# Patient Record
Sex: FEMALE | Race: WHITE | Employment: OTHER | URBAN - METROPOLITAN AREA
[De-identification: names, ages, dates, MRNs, and addresses within clinical notes are randomized per-mention and may not be internally consistent; named-entity substitution may affect disease eponyms.]

---

## 2017-01-06 DIAGNOSIS — R41.89 COGNITIVE IMPAIRMENT: ICD-10-CM

## 2017-03-01 ENCOUNTER — TELEPHONE (OUTPATIENT)
Dept: FAMILY MEDICINE CLINIC | Age: 72
End: 2017-03-01

## 2017-03-01 ENCOUNTER — OFFICE VISIT (OUTPATIENT)
Dept: FAMILY MEDICINE CLINIC | Age: 72
End: 2017-03-01

## 2017-03-01 VITALS
SYSTOLIC BLOOD PRESSURE: 114 MMHG | OXYGEN SATURATION: 97 % | HEIGHT: 64 IN | BODY MASS INDEX: 28.34 KG/M2 | TEMPERATURE: 98 F | WEIGHT: 166 LBS | HEART RATE: 76 BPM | DIASTOLIC BLOOD PRESSURE: 82 MMHG | RESPIRATION RATE: 20 BRPM

## 2017-03-01 DIAGNOSIS — F03.90 DEMENTIA WITHOUT BEHAVIORAL DISTURBANCE, UNSPECIFIED DEMENTIA TYPE: ICD-10-CM

## 2017-03-01 DIAGNOSIS — L30.9 DERMATITIS: ICD-10-CM

## 2017-03-01 DIAGNOSIS — Z23 ENCOUNTER FOR IMMUNIZATION: ICD-10-CM

## 2017-03-01 DIAGNOSIS — F32.2 SEVERE SINGLE CURRENT EPISODE OF MAJOR DEPRESSIVE DISORDER, WITHOUT PSYCHOTIC FEATURES (HCC): Primary | ICD-10-CM

## 2017-03-01 RX ORDER — TRIAMCINOLONE ACETONIDE 1 MG/G
OINTMENT TOPICAL 2 TIMES DAILY
Qty: 30 G | Refills: 0 | Status: SHIPPED | OUTPATIENT
Start: 2017-03-01 | End: 2017-04-14 | Stop reason: SDUPTHER

## 2017-03-01 NOTE — PROGRESS NOTES
Maci Dooley is a 80 y.o. female  1. Have you been to the ER, urgent care clinic since your last visit? Hospitalized since your last visit? No    2. Have you seen or consulted any other health care providers outside of the [address] [place] [address] since your last visit? Include any pap smears or colon screening.  No
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
## 2017-03-01 NOTE — TELEPHONE ENCOUNTER
[facility] called stating the pt is currently at their office and needs a referral. She is being seen for a \"skin check\" because she has areas of concern on chest.     is requesting that we call them with [facility] #    Dr. Sherice Quinones  NPI: [id number]  Phone: [phone]  Fax: [phone]

## 2017-03-01 NOTE — PROGRESS NOTES
PPD Placement note  Shankar Ortiz, 80 y.o. 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 Yes  Is patient taking any oral or IV steroid medication now or have they taken it in the last month? no  Has the patient ever received the BCG vaccine?: no  Has the patient been in recent contact with anyone known or suspected of having active TB disease?: no       Date of exposure (if applicable): na       Name of person they were exposed to (if applicable): na  Patient's Country of origin?: [place]  O: Alert and oriented in NAD. P:  PPD placed on 3/1/2017. Patient advised to return for reading within 48-72 hours.

## 2017-03-01 NOTE — MR AVS SNAPSHOT
Visit Information Date & Time Provider Department Dept. Phone Encounter #  
 3/1/2017 10:30 AM Jose Pham [facility] [phone] [id number] Upcoming Health Maintenance Date Due Hepatitis C Screening 1945 FOBT Q 1 YEAR, 18+ 2/8/2017 GLAUCOMA SCREENING Q2Y 9/22/2017 MEDICARE YEARLY EXAM 12/7/2017 COLONOSCOPY 8/13/2019 DTaP/Tdap/Td series (2 - Td) 8/22/2026 Allergies as of 3/1/2017  Review Complete On: 3/1/2017 By: Genevieve Cogan, LPN Severity Noted Reaction Type Reactions Latex Medium 07/25/2016    Other (comments) \"my skin peels off. \" \"just the bandages\" Mold Extracts High 07/25/2016    Other (comments) Sulfa (Sulfonamide Antibiotics) High   Shortness of Breath Throat closes up, can't breathe, vomit Sulfamethoxazole-trimethoprim High 07/25/2016    Other (comments) Tolterodine Medium 07/25/2016    Other (comments) Trospium Medium 07/25/2016    Other (comments) Oxycodone  08/24/2012    Other (comments) Weakness, vivid dreams Current Immunizations  Reviewed on 10/12/2015 Name Date Influenza High Dose Vaccine PF 1/3/2017, 9/6/2016  3:57 PM  
 Influenza Vaccine 11/1/2015, 10/10/2015, 10/24/2013, 11/9/2010, 11/2/2009, 11/4/2008 Influenza Vaccine PF  Incomplete, 12/10/2012, 11/2/2011 Novel Influenza-H1N1-09, All Formulations 12/29/2009 Pneumococcal Conjugate (PCV-13) 10/10/2015 Pneumococcal Polysaccharide (PPSV-23) 11/1/2015, 11/2/2009 Zoster Vaccine, Live 6/15/2014 Not reviewed this visit You Were Diagnosed With   
  
 Codes Comments Severe single current episode of major depressive disorder, without psychotic features ([facility] [address]    -  Primary ICD-10-CM: F32.2 ICD-9-CM: 296.23 Dermatitis     ICD-10-CM: L30.9 ICD-9-CM: 692.9 Dementia without behavioral disturbance, unspecified dementia type     ICD-10-CM: F03.90 ICD-9-CM: 294.20 Vitals  BP  
  
  
  
  
 114/82 (BP 1 Location: Left arm, BP Patient Position: Sitting) BMI and BSA Data Body Mass Index Body Surface Area  
 28.49 kg/m 2 1.84 m 2 Preferred Pharmacy Pharmacy Name Phone [facility], [address] Your Updated Medication List  
  
   
This list is accurate as of: 3/1/17 10:57 AM.  Always use your most recent med list.  
  
  
  
  
 escitalopram oxalate 20 mg tablet Commonly known as:  Cherre Jews Take 1 Tab by mouth daily. nitrofurantoin (macrocrystal-monohydrate) 100 mg capsule Commonly known as:  MACROBID Take 1 Cap by mouth daily. For purposes of UTI suppression. triamcinolone acetonide 0.1 % ointment Commonly known as:  KENALOG Apply  to affected area two (2) times a day. use thin layer Prescriptions Sent to Pharmacy Refills  
 triamcinolone acetonide (KENALOG) 0.1 % ointment 0 Sig: Apply  to affected area two (2) times a day. use thin layer Class: Normal  
 Pharmacy: [facility] [address] #: [phone] Route: Topical  
  
Introducing [facility] & HEALTH SERVICES! Dear Yaakov Martin: Thank you for requesting a [employer] account. Our records indicate that you have previously registered for a [employer] account but its currently inactive. Please call our In[employer] support line at [phone]. Additional Information If you have questions, please visit the Frequently Asked Questions section of the [employer] website at [email or web address] Remember, [employer] is NOT to be used for urgent needs. For medical emergencies, dial 911. Now available from your iPhone and Android! Please provide this summary of care documentation to your next provider. Your primary care clinician is listed as Caryl Umana. If you have any questions after today's visit, please call [phone].

## 2017-03-03 ENCOUNTER — CLINICAL SUPPORT (OUTPATIENT)
Dept: FAMILY MEDICINE CLINIC | Age: 72
End: 2017-03-03

## 2017-03-03 DIAGNOSIS — Z11.1 ENCOUNTER FOR PPD SKIN TEST READING: Primary | ICD-10-CM

## 2017-03-03 LAB
MM INDURATION POC: 0 MM (ref 0–5)
PPD POC: NEGATIVE NEGATIVE

## 2017-03-03 NOTE — LETTER
3/3/2017 10:35 AM 
 
Ms. Katt Hernandez [address] [facility] [address] [place] [address] Date PPD placed: March 1, 2017 Date PPD read: March 3, 2017 Result: Negative Read by:  [address]

## 2017-04-03 ENCOUNTER — TELEPHONE (OUTPATIENT)
Dept: FAMILY MEDICINE CLINIC | Age: 72
End: 2017-04-03

## 2017-04-03 NOTE — TELEPHONE ENCOUNTER
Pt needs a referral to [facility] for areas of concern for chest and face. Fax referral confirmation to [phone]. Pt is scheduled for 4/6/17. Pt has  PSafe.

## 2017-04-14 RX ORDER — TRIAMCINOLONE ACETONIDE 1 MG/G
OINTMENT TOPICAL 2 TIMES DAILY
Qty: 30 G | Refills: 0 | Status: SHIPPED | OUTPATIENT
Start: 2017-04-14 | End: 2017-06-07 | Stop reason: ALTCHOICE

## 2017-06-07 ENCOUNTER — OFFICE VISIT (OUTPATIENT)
Dept: FAMILY MEDICINE CLINIC | Age: 72
End: 2017-06-07

## 2017-06-07 VITALS
WEIGHT: 172 LBS | SYSTOLIC BLOOD PRESSURE: 124 MMHG | OXYGEN SATURATION: 96 % | DIASTOLIC BLOOD PRESSURE: 76 MMHG | RESPIRATION RATE: 16 BRPM | TEMPERATURE: 98.2 F | HEART RATE: 62 BPM | HEIGHT: 64 IN | BODY MASS INDEX: 29.37 KG/M2

## 2017-06-07 DIAGNOSIS — L30.9 DERMATITIS: Primary | ICD-10-CM

## 2017-06-07 RX ORDER — BISMUTH SUBSALICYLATE 262 MG
1 TABLET,CHEWABLE ORAL DAILY
COMMUNITY

## 2017-06-07 RX ORDER — ASPIRIN 81 MG/1
TABLET ORAL DAILY
COMMUNITY

## 2017-06-07 NOTE — MR AVS SNAPSHOT
Visit Information Date & Time Provider Department Dept. Phone Encounter #  
 6/7/2017 11:00 AM Zofia Oswald, Jose [facility] [phone] [id number] Upcoming Health Maintenance Date Due Hepatitis C Screening 1945 FOBT Q 1 YEAR, 18+ 2/8/2017 INFLUENZA AGE 9 TO ADULT 8/1/2017 GLAUCOMA SCREENING Q2Y 9/22/2017 MEDICARE YEARLY EXAM 12/7/2017 COLONOSCOPY 8/13/2019 DTaP/Tdap/Td series (2 - Td) 8/22/2026 Allergies as of 6/7/2017  Review Complete On: 6/7/2017 By: Grant Lowe LPN Severity Noted Reaction Type Reactions Latex Medium 07/25/2016    Other (comments) \"my skin peels off. \" \"just the bandages\" Mold Extracts High 07/25/2016    Other (comments) Sulfa (Sulfonamide Antibiotics) High   Shortness of Breath Throat closes up, can't breathe, vomit Sulfamethoxazole-trimethoprim High 07/25/2016    Other (comments) Tolterodine Medium 07/25/2016    Other (comments) Trospium Medium 07/25/2016    Other (comments) Oxycodone  08/24/2012    Other (comments) Weakness, vivid dreams Current Immunizations  Reviewed on 3/1/2017 Name Date Influenza High Dose Vaccine PF 1/3/2017, 9/6/2016  3:57 PM  
 Influenza Vaccine 11/1/2015, 10/10/2015, 10/24/2013, 11/9/2010, 11/2/2009, 11/4/2008 Influenza Vaccine PF  Incomplete, 12/10/2012, 11/2/2011 Novel Influenza-H1N1-09, All Formulations 12/29/2009 Pneumococcal Conjugate (PCV-13) 10/10/2015 Pneumococcal Polysaccharide (PPSV-23) 11/1/2015, 11/2/2009 TB Skin Test (PPD) Intradermal 3/1/2017 11:04 AM  
 Zoster Vaccine, Live 6/15/2014 Not reviewed this visit You Were Diagnosed With   
  
 Codes Comments Dermatitis    -  Primary ICD-10-CM: L30.9 ICD-9-CM: 692.9 Vitals BP Pulse Temp Resp Height(growth percentile) Weight(growth percentile)  124/76 (BP 1 Location: Right arm, BP Patient Position: Sitting) 62 98.2 °F (36.8 °C) (Oral) 16 5' 4\" (1.626 m) 172 lb (78 kg) SpO2 BMI OB Status Smoking Status 96% 29.52 kg/m2 Hysterectomy Never Smoker BMI and BSA Data Body Mass Index Body Surface Area  
 29.52 kg/m 2 1.88 m 2 Preferred Pharmacy Pharmacy Name Phone [facility], [address] Your Updated Medication List  
  
   
This list is accurate as of: 6/7/17 11:47 AM.  Always use your most recent med list.  
  
  
  
  
 aspirin delayed-release 81 mg tablet Take  by mouth daily. escitalopram oxalate 20 mg tablet Commonly known as:  Astrid Gins Take 1 Tab by mouth daily. multivitamin tablet Commonly known as:  ONE A DAY Take 1 Tab by mouth daily. nitrofurantoin (macrocrystal-monohydrate) 100 mg capsule Commonly known as:  MACROBID Take 1 Cap by mouth daily. For purposes of UTI suppression. We Performed the Following REFERRAL TO DERMATOLOGY [REF19 Custom] Comments:  
 doris Referral Information Referral ID Referred By Referred To  
  
 [id number] Andrew Murray V Not Available Visits Status Start Date End Date 1 New Request 6/7/17 6/7/18 If your referral has a status of pending review or denied, additional information will be sent to support the outcome of this decision. Introducing [facility] & HEALTH SERVICES! Dear Christin Mackey: Thank you for requesting a [employer] account. Our records indicate that you already have an active [employer] account. You can access your account anytime at [email or web address] Did you know that you can access your hospital and ER discharge instructions at any time in [employer]? You can also review all of your test results from your hospital stay or ER visit. Additional Information If you have questions, please visit the Frequently Asked Questions section of the [employer] management website at [email or web address] Remember, [employer] management is NOT to be used for urgent needs. For medical emergencies, dial 911. Now available from your iPhone and Android! Please provide this summary of care documentation to your next provider. Your primary care clinician is listed as Caryl Umana. If you have any questions after today's visit, please call [phone].

## 2017-06-07 NOTE — PROGRESS NOTES
Lorren Dubin is a 71 y.o. female here today for rash and itching and started a few weeks ago     1. Have you been to the ER, urgent care clinic or hospitalized since your last visit? NO.     2. Have you seen or consulted any other health care providers outside of the [address] [place] [address] since your last visit (Include any pap smears or colon screening)? NO      Do you have an Advanced Directive? Yes     Would you like information on Advanced Directives?  NO    Learning Assessment 9/9/2015   PRIMARY LEARNER Patient   HIGHEST LEVEL OF EDUCATION - PRIMARY LEARNER  4 YEARS OF COLLEGE   BARRIERS PRIMARY LEARNER -   CO-LEARNER CAREGIVER No   PRIMARY LANGUAGE ENGLISH   LEARNER PREFERENCE PRIMARY LISTENING     -   ANSWERED BY patient   RELATIONSHIP SELF

## 2017-06-07 NOTE — PROGRESS NOTES
Guero Moraes is a 71 y.o. female and presents with Rash and Skin Problem       Subjective:  Pt has a \"rash\" for a while. It's on her chest, stomach, scalp and posterior neck. She was prescribed kenalog but doesn't use it. It itches and she scratches a lot. ROS:  Negative except as mentioned above    SH:  Social History   Substance Use Topics    Smoking status: Never Smoker    Smokeless tobacco: Never Used    Alcohol use No         Medications/Allergies:  Current Outpatient Prescriptions on File Prior to Visit   Medication Sig Dispense Refill    nitrofurantoin, macrocrystal-monohydrate, (MACROBID) 100 mg capsule Take 1 Cap by mouth daily. For purposes of UTI suppression. 180 Cap 1    escitalopram oxalate (LEXAPRO) 20 mg tablet Take 1 Tab by mouth daily. 90 Tab 1     No current facility-administered medications on file prior to visit. Allergies   Allergen Reactions    Latex Other (comments)     \"my skin peels off. \" \"just the bandages\"    Mold Extracts Other (comments)    Sulfa (Sulfonamide Antibiotics) Shortness of Breath     Throat closes up, can't breathe, vomit    Sulfamethoxazole-Trimethoprim Other (comments)    Tolterodine Other (comments)    Trospium Other (comments)    Oxycodone Other (comments)     Weakness, vivid dreams       Objective:  Visit Vitals    /76 (BP 1 Location: Right arm, BP Patient Position: Sitting)    Pulse 62    Temp 98.2 °F (36.8 °C) (Oral)    Resp 16    Ht 5' 4\" (1.626 m)    Wt 172 lb (78 kg)    SpO2 96%    BMI 29.52 kg/m2     Constitutional: Well developed, nourished, no distress, alert   CV: S1, S2.  RRR. No murmurs/rubs. No thrills palpated. No carotid bruits. Intact distal pulses. No edema. Pulm: No abnormalities on inspection. Clear to auscultation bilaterally. No wheezing/rhonchi. Normal effort. Skin: scabs over R hairline/forehead, on stomach.   Chest wall (between breasts) with thickened erythematous rough plaque like lesion    Assessment/Plan:    Dermatitis - referral derm. The plan was discussed with the patient. The patient verbalized understanding and is in agreement with the plan. All medication potential side effects were discussed with the patient.

## 2017-07-03 DIAGNOSIS — F32.2 SEVERE SINGLE CURRENT EPISODE OF MAJOR DEPRESSIVE DISORDER, WITHOUT PSYCHOTIC FEATURES (HCC): ICD-10-CM

## 2017-07-03 RX ORDER — ESCITALOPRAM OXALATE 20 MG/1
20 TABLET ORAL DAILY
Qty: 90 TAB | Refills: 1 | Status: SHIPPED | OUTPATIENT
Start: 2017-07-03 | End: 2017-10-17 | Stop reason: ALTCHOICE

## 2017-08-15 ENCOUNTER — TELEPHONE (OUTPATIENT)
Dept: FAMILY MEDICINE CLINIC | Age: 72
End: 2017-08-15

## 2017-08-15 NOTE — TELEPHONE ENCOUNTER
Kieran Phillips (on pt's phi) called on behalf of the pt, he wanted to leave a message. He states that she has started the cylce again with the same exact reason like last time. He states that Dr Andrew Denise knows the situation and is requesting a call back.

## 2017-08-17 NOTE — TELEPHONE ENCOUNTER
I spoke with Dr. Tunde Agosto [phone], discussed patient's history, previous medications. I discussed often time the patient minimizes her symptoms because of a distrust of medical professionals and a h/o psychology training. In fact, patient has told me \"I know what I can and can't tell you\". The plan is to start mirtazapine, in addition to lexapro. I discussed this conversation with Vivek Eastman, the son, who is in agreement with this course of action.

## 2017-08-22 ENCOUNTER — TELEPHONE (OUTPATIENT)
Dept: FAMILY MEDICINE CLINIC | Age: 72
End: 2017-08-22

## 2017-08-22 NOTE — TELEPHONE ENCOUNTER
The [facility] in [place] called to schedule a hospital f/u appt for the pt. I was able to get the pt scheduled for a 30 min hospital f/u on Thursday 8/24 at 9am w/Dr. Griselda Filter.

## 2017-08-22 NOTE — TELEPHONE ENCOUNTER
Pt's daughter in law called stating that it was an emergency and that she needed to speak with Dr. Jovani SALAS. She states the Ms. Patel Benjamin was in a psych facility in [place] and when they went to visit they were repulsed by the conditions. She states that they are pulling Ms. Patel Benjamin out of the psych facility today and need an emergency appt. They are wanting to try to get Ms. Borja into [facility]. No available appts for tomorrow and no staff to ask if it is okay to double book. Please contact as soon as possible. Family is vwery concerned about pt.      Daughter in law Lily phone [phone]  Odalys Broussard phone [phone]

## 2017-08-24 ENCOUNTER — OFFICE VISIT (OUTPATIENT)
Dept: FAMILY MEDICINE CLINIC | Age: 72
End: 2017-08-24

## 2017-08-24 VITALS
HEART RATE: 74 BPM | HEIGHT: 64 IN | SYSTOLIC BLOOD PRESSURE: 124 MMHG | WEIGHT: 180.8 LBS | TEMPERATURE: 98.6 F | RESPIRATION RATE: 18 BRPM | BODY MASS INDEX: 30.87 KG/M2 | OXYGEN SATURATION: 95 % | DIASTOLIC BLOOD PRESSURE: 80 MMHG

## 2017-08-24 DIAGNOSIS — R45.89 SUICIDAL RISK: ICD-10-CM

## 2017-08-24 DIAGNOSIS — F05 SUNDOWNING: ICD-10-CM

## 2017-08-24 DIAGNOSIS — R41.3 MEMORY IMPAIRMENT: ICD-10-CM

## 2017-08-24 DIAGNOSIS — F41.8 DEPRESSION WITH ANXIETY: Primary | ICD-10-CM

## 2017-08-24 DIAGNOSIS — Z12.11 SCREEN FOR COLON CANCER: ICD-10-CM

## 2017-08-24 DIAGNOSIS — R48.8 PERSEVERATION: ICD-10-CM

## 2017-08-24 DIAGNOSIS — F22 PARANOID IDEATION (HCC): ICD-10-CM

## 2017-08-24 RX ORDER — MIRTAZAPINE 30 MG/1
30 TABLET, FILM COATED ORAL
Qty: 30 TAB | Refills: 1 | Status: SHIPPED | OUTPATIENT
Start: 2017-08-24 | End: 2017-10-17 | Stop reason: ALTCHOICE

## 2017-08-24 NOTE — MR AVS SNAPSHOT
Visit Information Date & Time Provider Department Dept. Phone Encounter #  
 8/24/2017  9:00 AM Elenita Brown, Maria Elena Parker [phone] [id number] Upcoming Health Maintenance Date Due Hepatitis C Screening 1945 FOBT Q 1 YEAR, 18+ 2/8/2017 GLAUCOMA SCREENING Q2Y 9/22/2017 MEDICARE YEARLY EXAM 12/7/2017 COLONOSCOPY 8/13/2019 DTaP/Tdap/Td series (2 - Td) 8/22/2026 Allergies as of 8/24/2017  Review Complete On: 8/24/2017 By: Elenita Brown MD  
  
 Severity Noted Reaction Type Reactions Latex Medium 07/25/2016    Other (comments) \"my skin peels off. \" \"just the bandages\" Mold Extracts High 07/25/2016    Other (comments) Sulfa (Sulfonamide Antibiotics) High   Shortness of Breath Throat closes up, can't breathe, vomit Sulfamethoxazole-trimethoprim High 07/25/2016    Other (comments) Tolterodine Medium 07/25/2016    Other (comments) Trospium Medium 07/25/2016    Other (comments) Oxycodone  08/24/2012    Other (comments) Weakness, vivid dreams Current Immunizations  Reviewed on 3/1/2017 Name Date Influenza High Dose Vaccine PF 8/22/2017, 1/3/2017, 9/6/2016  3:57 PM  
 Influenza Vaccine 11/1/2015, 10/10/2015, 10/24/2013, 11/9/2010, 11/2/2009, 11/4/2008 Influenza Vaccine PF 12/10/2012, 11/2/2011 Novel Influenza-H1N1-09, All Formulations 12/29/2009 Pneumococcal Conjugate (PCV-13) 10/10/2015 Pneumococcal Polysaccharide (PPSV-23) 11/1/2015, 11/2/2009 TB Skin Test (PPD) Intradermal 3/1/2017 11:04 AM  
 Zoster Vaccine, Live 6/15/2014 Not reviewed this visit You Were Diagnosed With   
  
 Codes Comments Depression with anxiety    -  Primary ICD-10-CM: F41.8 ICD-9-CM: 300.4 Suicidal risk     ICD-10-CM: R45.89 ICD-9-CM: 300.9 Sundowning     ICD-10-CM: F05 ICD-9-CM: [id number] Perseveration     ICD-10-CM: R48.8 ICD-9-CM: 784.69  Paranoid ideation ([facility] [address]     ICD-10-CM: F22 
 ICD-9-CM: 297.8 Screen for colon cancer     ICD-10-CM: Z12.11 ICD-9-CM: V76.51 Memory impairment     ICD-10-CM: R41.3 ICD-9-CM: 780.93 Vitals BP Pulse Temp Resp Height(growth percentile) Weight(growth percentile) 124/80 (BP 1 Location: Left arm, BP Patient Position: Sitting) 74 98.6 °F (37 °C) (Oral) 18 5' 4\" (1.626 m) 180 lb 12.8 oz (82 kg) SpO2 BMI OB Status Smoking Status 95% 31.03 kg/m2 Hysterectomy Never Smoker BMI and BSA Data Body Mass Index Body Surface Area 31.03 kg/m 2 1.92 m 2 Preferred Pharmacy Pharmacy Name Phone [facility], [address] [place] [address],[address] Your Updated Medication List  
  
   
This list is accurate as of: 8/24/17  9:51 AM.  Always use your most recent med list.  
  
  
  
  
 aspirin delayed-release 81 mg tablet Take  by mouth daily. escitalopram oxalate 20 mg tablet Commonly known as:  Giuseppe Snow Take 1 Tab by mouth daily. mirtazapine 30 mg tablet Commonly known as:  Marguerita Eagle Take 1 Tab by mouth nightly. multivitamin tablet Commonly known as:  ONE A DAY Take 1 Tab by mouth daily. Prescriptions Sent to Pharmacy Refills  
 mirtazapine (REMERON) 30 mg tablet 1 Sig: Take 1 Tab by mouth nightly. Class: Normal  
 Pharmacy: [address] #: [phone] Route: Oral  
  
We Performed the Following COLOGUARD TEST (FECAL DNA COLORECTAL CANCER SCREENING) [39629 CPT(R)] Patient Instructions Dr. Nae Romero Geriatric psychiatry [facility]-Psychiatry 
[phone] [facility]! Dear Cristina Camp: Thank you for requesting a [employer] account. Our records indicate that you already have an active [employer] account. You can access your account anytime at [email or web address] Did you know that you can access your hospital and ER discharge instructions at any time in Essential Viewing? You can also review all of your test results from your hospital stay or ER visit. Additional Information If you have questions, please visit the Frequently Asked Questions section of the Essential Viewing website at [email or web address] Remember, Essential Viewing is NOT to be used for urgent needs. For medical emergencies, dial 911. Now available from your iPhone and Android! Please provide this summary of care documentation to your next provider. Your primary care clinician is listed as Caryl Umana. If you have any questions after today's visit, please call [phone].
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
## 2017-08-24 NOTE — PROGRESS NOTES
1. Have you been to the ER, urgent care clinic since your last visit? Hospitalized since your last visit? Yes Where: [facility] 8/22 went in 8/5    2. Have you seen or consulted any other health care providers outside of the [address] [place] [address] since your last visit? Include any pap smears or colon screening. No      Health Maintenance Due   Topic Date Due    Hepatitis C Screening  1945    FOBT Q 1 YEAR, 18+  02/08/2017    GLAUCOMA SCREENING Q2Y  09/22/2017       Please see Red banners under Allergies, Med rec, Immunizations to remove outside inquires. All correct information has been verified with patient and added to chart.

## 2017-09-07 ENCOUNTER — TELEPHONE (OUTPATIENT)
Dept: FAMILY MEDICINE CLINIC | Age: 72
End: 2017-09-07

## 2017-09-07 NOTE — TELEPHONE ENCOUNTER
Pt daughter in law called to request a rx mirtazapine 15 mg. Lily stated that when they talked to Dr Leslie Mahajan there was talk about doubling the mg. She also stated that Dr. Leslie Mahajan has never prescribed the rx to the pt.

## 2017-10-06 ENCOUNTER — TELEPHONE (OUTPATIENT)
Dept: FAMILY MEDICINE CLINIC | Age: 72
End: 2017-10-06
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
## 2017-10-16 ENCOUNTER — TELEPHONE (OUTPATIENT)
Dept: FAMILY MEDICINE CLINIC | Age: 72
End: 2017-10-16

## 2017-10-16 ENCOUNTER — OFFICE VISIT (OUTPATIENT)
Dept: FAMILY MEDICINE CLINIC | Age: 72
End: 2017-10-16

## 2017-10-16 VITALS
RESPIRATION RATE: 18 BRPM | TEMPERATURE: 97.9 F | HEART RATE: 72 BPM | BODY MASS INDEX: 31.58 KG/M2 | OXYGEN SATURATION: 96 % | SYSTOLIC BLOOD PRESSURE: 138 MMHG | DIASTOLIC BLOOD PRESSURE: 76 MMHG | WEIGHT: 185 LBS | HEIGHT: 64 IN

## 2017-10-16 DIAGNOSIS — F32.2 SEVERE SINGLE CURRENT EPISODE OF MAJOR DEPRESSIVE DISORDER, WITHOUT PSYCHOTIC FEATURES (HCC): Primary | ICD-10-CM

## 2017-10-16 NOTE — PATIENT INSTRUCTIONS
Make an appointment with:     [facility] SPECIALTY [facility] Psychiatry  [address], [facility] [place] [address] [address] [address] [facility], [address]  [phone]

## 2017-10-16 NOTE — TELEPHONE ENCOUNTER
Son Maureen Ness has the information. He also asked for neuropsych drs and I gave him Dr. Mikayla Thapa and Ervin Rue Marcela Aguila.

## 2017-10-16 NOTE — TELEPHONE ENCOUNTER
Please call Jacki Szymanski, patient's son/poa, and tell him that I saw his mom today. I'm strongly recommending she see psychiatry here. She already has a psychologist/counselor. But, I feels she needs intervention from a psychiatrist medically. [address] [place] [address]  [phone]

## 2017-10-16 NOTE — MR AVS SNAPSHOT
Visit Information Date & Time Provider Department Dept. Phone Encounter #  
 10/16/2017  9:30 AM Abiodun Fong, [place] [phone] [id number] Upcoming Health Maintenance Date Due Hepatitis C Screening 1945 FOBT Q 1 YEAR, 18+ 2/8/2017 GLAUCOMA SCREENING Q2Y 9/22/2017 MEDICARE YEARLY EXAM 12/7/2017 COLONOSCOPY 8/13/2019 DTaP/Tdap/Td series (2 - Td) 8/22/2026 Allergies as of 10/16/2017  Review Complete On: 10/16/2017 By: Abiodun Fong MD  
  
 Severity Noted Reaction Type Reactions Latex Medium 07/25/2016    Other (comments) \"my skin peels off. \" \"just the bandages\" Mold Extracts High 07/25/2016    Other (comments) Sulfa (Sulfonamide Antibiotics) High   Shortness of Breath Throat closes up, can't breathe, vomit Sulfamethoxazole-trimethoprim High 07/25/2016    Other (comments) Tolterodine Medium 07/25/2016    Other (comments) Trospium Medium 07/25/2016    Other (comments) Oxycodone  08/24/2012    Other (comments) Weakness, vivid dreams Current Immunizations  Reviewed on 3/1/2017 Name Date Influenza High Dose Vaccine PF 8/22/2017, 1/3/2017, 9/6/2016  3:57 PM  
 Influenza Vaccine 11/1/2015, 10/10/2015, 10/24/2013, 11/9/2010, 11/2/2009, 11/4/2008 Influenza Vaccine PF 12/10/2012, 11/2/2011 Novel Influenza-H1N1-09, All Formulations 12/29/2009 Pneumococcal Conjugate (PCV-13) 10/10/2015 Pneumococcal Polysaccharide (PPSV-23) 11/1/2015, 11/2/2009 TB Skin Test (PPD) Intradermal 3/1/2017 11:04 AM  
 Zoster Vaccine, Live 6/15/2014 Not reviewed this visit Vitals BP Pulse Temp Resp Height(growth percentile) Weight(growth percentile) 138/76 (BP 1 Location: Left arm, BP Patient Position: Sitting) 72 97.9 °F (36.6 °C) (Oral) 18 5' 4\" (1.626 m) 185 lb (83.9 kg) SpO2 BMI OB Status Smoking Status 96% 31.76 kg/m2 Hysterectomy Never Smoker BMI and BSA Data Body Mass Index Body Surface Area 31.76 kg/m 2 1.95 m 2 Preferred Pharmacy Pharmacy Name Phone [facility], [address] [facility] Your Updated Medication List  
  
   
This list is accurate as of: 10/16/17  9:55 AM.  Always use your most recent med list.  
  
  
  
  
 aspirin delayed-release 81 mg tablet Take  by mouth daily. escitalopram oxalate 20 mg tablet Commonly known as:  Ravi Barban Take 1 Tab by mouth daily. mirtazapine 30 mg tablet Commonly known as:  Wesley Pleva Take 1 Tab by mouth nightly. multivitamin tablet Commonly known as:  ONE A DAY Take 1 Tab by mouth daily. Patient Instructions Make an appointment with:  
 
[facility] [address] Psychotherapy [place] [facility], [address] [place] [address] 
[phone] [facility] SERVICES! Dear Annika Mueller: Thank you for requesting a [employer] account. Our records indicate that you already have an active [employer] account. You can access your account anytime at [email or web address] Did you know that you can access your hospital and ER discharge instructions at any time in Araca? You can also review all of your test results from your hospital stay or ER visit. Additional Information If you have questions, please visit the Frequently Asked Questions section of the Araca website at [email or web address] Remember, Araca is NOT to be used for urgent needs. For medical emergencies, dial 911. Now available from your iPhone and Android! Please provide this summary of care documentation to your next provider. Your primary care clinician is listed as Caryl Umana. If you have any questions after today's visit, please call [phone].
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
## 2017-10-16 NOTE — PROGRESS NOTES
Kimberly Marc is a 71 y.o. female is here for [facility] follow up. She states she is taking one medication a day that her daughter in law gives her and she doesn't know the name of the medication. 1. Have you been to the ER, urgent care clinic since your last visit? Hospitalized since your last visit? Psych in [place]    2. Have you seen or consulted any other health care providers outside of the [address] [place] [address] since your last visit? Include any pap smears or colon screening. No     Health Maintenance Due   Topic Date Due    Hepatitis C Screening  1945    FOBT Q 1 YEAR, 18+  02/08/2017    GLAUCOMA SCREENING Q2Y  09/22/2017     Pt states she has already had her flu shot.

## 2017-10-17 RX ORDER — QUETIAPINE FUMARATE 50 MG/1
50 TABLET, FILM COATED ORAL
COMMUNITY
Start: 2017-10-17 | End: 2017-10-30 | Stop reason: ALTCHOICE

## 2017-10-17 RX ORDER — SERTRALINE HYDROCHLORIDE 50 MG/1
75 TABLET, FILM COATED ORAL DAILY
COMMUNITY
Start: 2017-10-17 | End: 2017-10-30 | Stop reason: ALTCHOICE

## 2017-10-20 ENCOUNTER — TELEPHONE (OUTPATIENT)
Dept: FAMILY MEDICINE CLINIC | Age: 72
End: 2017-10-20
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
## 2017-10-24 NOTE — TELEPHONE ENCOUNTER
Spoke with Jared Alfaro. They switched pt to lexapro and mirtazapine b/c of uncontrolled outbursts and anger on the previous regimen. They have plans of putting her in shelter in [place]. They have PCP and psych in house there and they will be taking care of her.

## 2017-10-25 ENCOUNTER — DOCUMENTATION ONLY (OUTPATIENT)
Dept: FAMILY MEDICINE CLINIC | Age: 72
End: 2017-10-25

## 2017-10-30 ENCOUNTER — TELEPHONE (OUTPATIENT)
Dept: FAMILY MEDICINE CLINIC | Age: 72
End: 2017-10-30

## 2017-10-30 DIAGNOSIS — F32.2 SEVERE SINGLE CURRENT EPISODE OF MAJOR DEPRESSIVE DISORDER, WITHOUT PSYCHOTIC FEATURES (HCC): ICD-10-CM

## 2017-10-30 RX ORDER — ESCITALOPRAM OXALATE 20 MG/1
20 TABLET ORAL DAILY
Qty: 30 TAB | Refills: 1 | Status: SHIPPED | OUTPATIENT
Start: 2017-10-30

## 2017-10-30 RX ORDER — MIRTAZAPINE 30 MG/1
30 TABLET, FILM COATED ORAL
Qty: 30 TAB | Refills: 1 | COMMUNITY
Start: 2017-10-30

## 2017-10-30 NOTE — TELEPHONE ENCOUNTER
Pt's daughter in law Lily called stating they have placed Ms. Hillary Dyson at [facility]. She states that she believes Ms. Hillary Dyson was taken off the Lexapro and put on Sertraline however the medication was not working as well as the Lexapro. Currently, the main issue with the pt's medications is that they were not accurate/ up to date on the paperwork for the assisted living facility so the facility has only been giving Ms. Borja Mirtazapine. Lily is requesting an rx for Lexapro be sent in to the pt's preferred pharmacy so she can bring the medication to the assisted living facility.     Please advise

## 2019-11-27 ENCOUNTER — HOSPITAL ENCOUNTER (OUTPATIENT)
Dept: LAB | Age: 74
Discharge: HOME | End: 2019-11-27

## 2019-11-27 LAB
APPEARANCE UR: ABNORMAL
BACTERIA URNS QL MICRO: ABNORMAL /HPF
BILIRUB UR QL: NEGATIVE
COLOR UR: YELLOW
EPITH CASTS URNS QL MICRO: ABNORMAL /LPF (ref 0–5)
GLUCOSE UR STRIP.AUTO-MCNC: NEGATIVE MG/DL
HGB UR QL STRIP: ABNORMAL
HYALINE CASTS URNS QL MICRO: ABNORMAL /LPF (ref 0–2)
KETONES UR QL STRIP.AUTO: NEGATIVE MG/DL
LEUKOCYTE ESTERASE UR QL STRIP.AUTO: ABNORMAL
NITRITE UR QL STRIP.AUTO: POSITIVE
PH UR STRIP: 5.5 [PH] (ref 5–8)
PROT UR STRIP-MCNC: NEGATIVE MG/DL
RBC #/AREA URNS HPF: ABNORMAL /HPF (ref 0–5)
SP GR UR REFRACTOMETRY: 1.02 (ref 1–1.03)
UROBILINOGEN UR QL STRIP.AUTO: 0.2 EU/DL (ref 0.2–1)
WBC URNS QL MICRO: ABNORMAL /HPF (ref 0–4)

## 2019-11-27 PROCEDURE — 81001 URINALYSIS AUTO W/SCOPE: CPT

## 2020-05-04 ENCOUNTER — TELEPHONE (OUTPATIENT)
Dept: FAMILY MEDICINE CLINIC | Age: 75
End: 2020-05-04

## 2020-05-04 DIAGNOSIS — Z20.822 SUSPECTED COVID-19 VIRUS INFECTION: ICD-10-CM

## 2020-05-04 DIAGNOSIS — Z20.822 SUSPECTED COVID-19 VIRUS INFECTION: Primary | ICD-10-CM

## 2020-05-04 NOTE — TELEPHONE ENCOUNTER
The last time I saw her was almost 3 years ago. Can the physician at her current nursing home do it? If not, she will need in-office visit for cpe. However, she will need covid testing PRIOR to coming to office b/c she resides in a nursing home and is considered high risk for having the infection. Furthermore, her accepting nursing facility will likely need that documentation as well. I will order covid testing, but needs to be done at [address] [place] [address] or [facility].

## 2020-05-04 NOTE — TELEPHONE ENCOUNTER
Called daughter back. She is moving mom to the [facility]. The nursing director is doing it after all. She doesn't need any more orders.

## 2020-05-04 NOTE — TELEPHONE ENCOUNTER
Pt's daughter called on behalf of the patient because they are moving her to a new nursing home on 5/20/2020 and they are requiring her to have a CPE and TB test w/ paperwork before they will allow her to move in. I wasn't sure how to go about scheduling her. Should we have her drop off the paperwork and do a VV and then come in to do a TB test in ov? Should we just have her come in to do an in office visit for both the physical and TB test. Pt has [employer] so we should be able to schedule her a virtual wellness visit. Please advise. No future appointments.

## 2022-03-19 PROBLEM — R48.8 PERSEVERATION: Status: ACTIVE | Noted: 2017-08-24

## 2022-03-19 PROBLEM — F41.8 DEPRESSION WITH ANXIETY: Status: ACTIVE | Noted: 2017-08-24

## 2022-03-19 PROBLEM — R45.89 SUICIDAL RISK: Status: ACTIVE | Noted: 2017-08-24

## 2023-05-26 RX ORDER — MIRTAZAPINE 30 MG/1
30 TABLET, FILM COATED ORAL
COMMUNITY
Start: 2017-10-30

## 2023-05-26 RX ORDER — ASPIRIN 81 MG/1
TABLET ORAL DAILY
COMMUNITY

## 2023-05-26 RX ORDER — ESCITALOPRAM OXALATE 20 MG/1
20 TABLET ORAL DAILY
COMMUNITY
Start: 2017-10-30